# Patient Record
Sex: MALE | Race: WHITE | NOT HISPANIC OR LATINO | Employment: OTHER | URBAN - METROPOLITAN AREA
[De-identification: names, ages, dates, MRNs, and addresses within clinical notes are randomized per-mention and may not be internally consistent; named-entity substitution may affect disease eponyms.]

---

## 2022-11-28 ENCOUNTER — OFFICE VISIT (OUTPATIENT)
Dept: URGENT CARE | Facility: CLINIC | Age: 62
End: 2022-11-28

## 2022-11-28 VITALS — HEART RATE: 86 BPM | RESPIRATION RATE: 16 BRPM | OXYGEN SATURATION: 98 % | TEMPERATURE: 99.3 F

## 2022-11-28 DIAGNOSIS — B34.9 VIRAL INFECTION: Primary | ICD-10-CM

## 2022-11-28 DIAGNOSIS — Z20.828 CONTACT WITH AND (SUSPECTED) EXPOSURE TO OTHER VIRAL COMMUNICABLE DISEASES: ICD-10-CM

## 2022-11-28 NOTE — PROGRESS NOTES
[facility]        NAME: Ej Brito is a 62 y o  male  : 1960    MRN: [id number]  DATE: 2022  TIME: 11:18 AM    Assessment and Plan   Viral infection [B34 9]  1  Viral infection  Covid19 and INFLUENZA A/B PCR      2  Contact with and (suspected) exposure to other viral communicable diseases  Covid19 and INFLUENZA A/B PCR        Discussed strict return to care precautions as well as red flag symptoms which should prompt immediate ED referral  Pt verbalized understanding and is in agreement with plan  Please follow up with your primary care provider within the next week  Please remember that your visit today was with an urgent care provider and should not replace follow up with your primary care provider for chronic medical issues or annual physicals  (Directly from [facility])  IF YOU TEST POSITIVE FOR COVID-19:  If you have symptoms, you can end isolation after 5 full days if you are fever-free for 24 hours without the use of fever-reducing medication and your other symptoms have improved  Loss of taste and/or smell may persist for weeks or more and should not delay the end of isolation  Your first day of symptoms is DAY ZERO  You should continue to wear a well-fitting mask (like N95, [id number]) both at home and in public for 5 additional days  Avoid people who are at high risk for severe disease for at least 10 days  DO NOT go to places where you are unable to wear a mask until a full 10 days from your first symptom  If you have no symptoms, quarantine for 5 days from the day you were tested  The day you were tested is DAY ZERO  Continue wearing a mask around others until day 10  If you develop symptoms, your 5-day isolation period starts over  If you have/had severe symptoms and/or a compromised immune system, you may need to isolate longer  Consult with your primary care provider about when you can resume being around other people      IF YOU HAVE HAD CLOSE EXPOSURE:  QUARANTINE IF: You are ages 25 or older and either have not been vaccinated, or have been vaccinated with your primary series but not the booster  You must quarantine for at least 5 days after your last contact  If you develop symptoms, get tested immediately  If you do not develop symptoms, get tested at least 5 days after your last exposure  Avoid people who are immunocompromised at high risk for severe disease until at least after 10 days  YOU DO NOT HAVE TO QUARANTINE IF:   • You are 18 years or older and have received all recommended vaccine doses, including boosters and additional primary shots for some immunocompromised people  • You are ages 9-17 and completed the primary series of COVID-19 vaccines  • You had confirmed COVID-19 within the last 90 days on a viral test   You should still wear a well-fitting mask around others for 10 days from the date of your last close exposure to COVID-19, and get tested at least 5 days later  Quarantine and isolation guidelines differ for healthcare professionals  Patient Instructions       Follow up with PCP in 3-5 days  Proceed to  ER if symptoms worsen  Chief Complaint     Chief Complaint   Patient presents with   • Cold Like Symptoms     Pt presents with body aches, chills/sweats, cough, started on Saturday; positive exp to the Flu family member         History of Present Illness       Burke Buchanan is a(n) 62 y o  male presenting with URI symptoms x 2 days  Past medical history: none reported  Congestion: yes  Sore throat: no  Cough: yes  Sputum production: no  Fever: yes, subjective  Body aches: yes  Loss of smell/taste: no  GI symptoms: no  Known sick contacts: yes, household members flu positive  OTC meds tried: tylenol, mucinex  Vaccinated against flu: no        Review of Systems   Review of Systems   Constitutional:        Negative except as noted in HPI   Respiratory: Negative for shortness of breath  Cardiovascular: Negative for chest pain  Current Medications     No current outpatient medications on file  Current Allergies     Allergies as of 11/28/2022 - Reviewed 11/28/2022   Allergen Reaction Noted   • Codeine Vomiting 01/23/2013            The following portions of the patient's history were reviewed and updated as appropriate: allergies, current medications, past family history, past medical history, past social history, past surgical history and problem list      History reviewed  No pertinent past medical history  Past Surgical History:   Procedure Laterality Date   • KNEE SURGERY Right        History reviewed  No pertinent family history  Medications have been verified  Objective   Pulse 86   Temp 99 3 °F (37 4 °C)   Resp 16   SpO2 98%        Physical Exam     Physical Exam  Vitals and nursing note reviewed  Constitutional:       General: He is not in acute distress  Appearance: Normal appearance  He is not toxic-appearing  HENT:      Head: Normocephalic and atraumatic  Right Ear: Tympanic membrane, ear canal and external ear normal       Left Ear: Tympanic membrane, ear canal and external ear normal       Nose: No congestion  Mouth/Throat:      Mouth: Mucous membranes are moist       Pharynx: Oropharynx is clear  No oropharyngeal exudate or posterior oropharyngeal erythema  Eyes:      Conjunctiva/sclera: Conjunctivae normal       Pupils: Pupils are equal, round, and reactive to light  Cardiovascular:      Rate and Rhythm: Normal rate and regular rhythm  Heart sounds: Normal heart sounds  Pulmonary:      Effort: Pulmonary effort is normal  No respiratory distress  Breath sounds: Normal breath sounds  No wheezing, rhonchi or rales  Abdominal:      General: Abdomen is flat  Palpations: Abdomen is soft  Musculoskeletal:      Cervical back: Normal range of motion and neck supple  Skin:     General: Skin is warm and dry        Capillary Refill: Capillary refill takes less than 2 seconds  Neurological:      Mental Status: He is alert and oriented to person, place, and time     Psychiatric:         Behavior: Behavior normal

## 2022-11-28 NOTE — LETTER
[facility] [place] [id number] [facility] [address]  [phone]  Dept: [phone]    November 28, 2022    Patient: Gosia Schofield  YOB: 1960    Gosia Schofield was seen and evaluated at our [facility]  Please note if Covid and Flu tests are negative, they may return to work when fever free for 24 hours without the use of a fever reducing agent  If Covid or Flu test is positive, they may return to work on 12/3/2022, as this is 5 days from the onset of symptoms  Upon return, they must then adhere to strict masking for an additional 5 days      Sincerely,    Radha Rodriguez PA-C

## 2022-11-29 LAB
FLUAV RNA RESP QL NAA+PROBE: NEGATIVE
FLUBV RNA RESP QL NAA+PROBE: NEGATIVE
SARS-COV-2 RNA RESP QL NAA+PROBE: NEGATIVE